# Patient Record
Sex: FEMALE | Race: WHITE | NOT HISPANIC OR LATINO | Employment: STUDENT | URBAN - METROPOLITAN AREA
[De-identification: names, ages, dates, MRNs, and addresses within clinical notes are randomized per-mention and may not be internally consistent; named-entity substitution may affect disease eponyms.]

---

## 2023-03-28 DIAGNOSIS — F41.9 ANXIETY: Primary | ICD-10-CM

## 2023-03-28 RX ORDER — ESCITALOPRAM OXALATE 10 MG/1
TABLET ORAL
Qty: 90 TABLET | Refills: 3 | Status: SHIPPED | OUTPATIENT
Start: 2023-03-28 | End: 2023-11-20 | Stop reason: SDUPTHER

## 2023-11-13 DIAGNOSIS — J45.20 MILD INTERMITTENT ASTHMA WITHOUT COMPLICATION (HHS-HCC): Primary | ICD-10-CM

## 2023-11-13 RX ORDER — MONTELUKAST SODIUM 10 MG/1
10 TABLET ORAL DAILY
Qty: 90 TABLET | Refills: 2 | Status: SHIPPED | OUTPATIENT
Start: 2023-11-13 | End: 2023-11-20 | Stop reason: SDUPTHER

## 2023-11-13 RX ORDER — DESOGESTREL AND ETHINYL ESTRADIOL 0.15-0.03
KIT ORAL
COMMUNITY
Start: 2023-10-24

## 2023-11-13 RX ORDER — MONTELUKAST SODIUM 10 MG/1
1 TABLET ORAL DAILY
COMMUNITY
Start: 2021-05-13 | End: 2023-11-13 | Stop reason: SDUPTHER

## 2023-11-20 ENCOUNTER — OFFICE VISIT (OUTPATIENT)
Dept: PRIMARY CARE | Facility: CLINIC | Age: 18
End: 2023-11-20
Payer: COMMERCIAL

## 2023-11-20 VITALS
TEMPERATURE: 97.3 F | HEART RATE: 85 BPM | DIASTOLIC BLOOD PRESSURE: 72 MMHG | HEIGHT: 65 IN | BODY MASS INDEX: 22.66 KG/M2 | WEIGHT: 136 LBS | OXYGEN SATURATION: 99 % | SYSTOLIC BLOOD PRESSURE: 116 MMHG

## 2023-11-20 DIAGNOSIS — Z00.00 WELL ADULT EXAM: Primary | ICD-10-CM

## 2023-11-20 DIAGNOSIS — R53.83 OTHER FATIGUE: ICD-10-CM

## 2023-11-20 DIAGNOSIS — F41.9 ANXIETY: ICD-10-CM

## 2023-11-20 DIAGNOSIS — J45.20 MILD INTERMITTENT ASTHMA WITHOUT COMPLICATION (HHS-HCC): ICD-10-CM

## 2023-11-20 PROBLEM — F32.A ANXIETY AND DEPRESSION: Status: ACTIVE | Noted: 2023-11-20

## 2023-11-20 PROCEDURE — 1036F TOBACCO NON-USER: CPT | Performed by: FAMILY MEDICINE

## 2023-11-20 PROCEDURE — 99395 PREV VISIT EST AGE 18-39: CPT | Performed by: FAMILY MEDICINE

## 2023-11-20 RX ORDER — MONTELUKAST SODIUM 10 MG/1
10 TABLET ORAL DAILY
Qty: 90 TABLET | Refills: 2 | Status: SHIPPED | OUTPATIENT
Start: 2023-11-20

## 2023-11-20 RX ORDER — ESCITALOPRAM OXALATE 10 MG/1
15 TABLET ORAL DAILY
Qty: 45 TABLET | Refills: 11 | Status: SHIPPED | OUTPATIENT
Start: 2023-11-20 | End: 2024-02-29

## 2023-11-20 RX ORDER — CHOLECALCIFEROL (VITAMIN D3) 100000/G
POWDER (GRAM) MISCELLANEOUS
COMMUNITY

## 2023-11-20 ASSESSMENT — PATIENT HEALTH QUESTIONNAIRE - PHQ9
10. IF YOU CHECKED OFF ANY PROBLEMS, HOW DIFFICULT HAVE THESE PROBLEMS MADE IT FOR YOU TO DO YOUR WORK, TAKE CARE OF THINGS AT HOME, OR GET ALONG WITH OTHER PEOPLE: SOMEWHAT DIFFICULT
3. TROUBLE FALLING OR STAYING ASLEEP OR SLEEPING TOO MUCH: SEVERAL DAYS
1. LITTLE INTEREST OR PLEASURE IN DOING THINGS: MORE THAN HALF THE DAYS
2. FEELING DOWN, DEPRESSED OR HOPELESS: MORE THAN HALF THE DAYS
SUM OF ALL RESPONSES TO PHQ9 QUESTIONS 1 AND 2: 4

## 2023-11-20 NOTE — PROGRESS NOTES
Subjective   Patient ID: Carlie Huynh is a 18 y.o. female who presents for annual well exam.  PHQ-9 and KATRIN-7  HPI  Concerns today: Depression and anxiety    In general the patient states that her health is:    Regular dental visits: Regular  Vision problems: Has been normal  Hearing loss: no     Diet: Leaning more towards pescitarian and misses meals.  Has body issues  Exercise: She is running 20 couple days a week   Weight concerns: yes    Tobacco use:  none  Alcohol use:  Socially  Illicit drug use: None    Reproductive health:  Premenopausal-  Regular periods    Contraception: yes  Sexual activity:Yes Monogamous  Menstrual problems: none  Pregnancy history:none    Cervical cancer screening: normal  Last Pap and pelvic was done  Any history of abnormal Pap smear    Breast cancer screening:  Family history of breast cancer    Review of Systems    Objective   Physical Exam  General: Patient is alert and oriented ×3 and appears in no acute distress. No respiratory distress.    Head: Atraumatic normocephalic.    Eyes: EOMI, PERRLA      Ears: Canals patent without any irritation, tympanic membranes without inflammation, no swelling, normal light reflex.    Nose: Nares patent. Turbinates are not swollen. No discharge.    Mouth: Normal mucosa. Moist. No erythema, exudates, tonsillar enlargement.    Neck: Normal range of motion, no masses.  Thyroid is palpable and normal in size without any nodules. No anterior cervical or posterior cervical adenopathy.    Heart: Regular rate and rhythm, no murmurs clicks or gallops    Lungs: Clear to auscultation bilaterally without any rhonchi rales or wheezing, lung sounds heard throughout all lung fields    Abdomen: Soft, nontender, no rigidity, rebound, guarding or organomegaly. Bowel sounds ×4 quadrants.    Musculoskeletal: Normal range of motion, strength is grossly intact in the proximal distal muscles of the upper and lower extremities bilaterally, deep tendon reflexes +2 out  of 4 and symmetric bilaterally at the patella, Achilles, biceps, triceps, sensation intact.    Nerves: Cranial nerves II through XII appear grossly intact and without deficit    Skin: Intact, dry, no rashes or erythema    Psych: Normal affect.  Assessment/Plan   Problem List Items Addressed This Visit       Mild intermittent asthma without complication    Relevant Medications    montelukast (Singulair) 10 mg tablet     Other Visit Diagnoses       Well adult exam    -  Primary    Anxiety        Relevant Medications    escitalopram (Lexapro) 10 mg tablet    Other fatigue        Relevant Orders    CBC and Auto Differential    Comprehensive Metabolic Panel    TSH with reflex to Free T4 if abnormal    Vitamin B12    Vitamin D 1,25 Dihydroxy (for eval of hypercalcemia)    Magnesium    Ferritin    Iron and TIBC        The patient is a well 18-year-old female who is dealing with some increased depression  She also has some fatigue that we ordered labs for  For the depression she is seeing a counselor.  We did increase her Lexapro so she will be taking 15 mg daily  Labs ordered  Discussed that she should increase her meals.  She is dealing with some body image issues.  Suggested that she start to eat some small protein snacks throughout the day.  Patient did say that she does have some thoughts that relate to suicidal ideations but denies that she has any plan.  She is in contact with her counselor and also has mentioned these to her mother and is very open about talking with about them.  States that she felt that it would just be easier if she was not here sometimes.  We could have her follow-up in 6 weeks so that we can see if the medication increase is working.

## 2024-01-08 ENCOUNTER — LAB (OUTPATIENT)
Dept: LAB | Facility: LAB | Age: 19
End: 2024-01-08
Payer: COMMERCIAL

## 2024-01-08 ENCOUNTER — APPOINTMENT (OUTPATIENT)
Dept: PRIMARY CARE | Facility: CLINIC | Age: 19
End: 2024-01-08
Payer: COMMERCIAL

## 2024-01-08 DIAGNOSIS — R53.83 OTHER FATIGUE: ICD-10-CM

## 2024-01-08 LAB
ALBUMIN SERPL BCP-MCNC: 4.4 G/DL (ref 3.4–5)
ALP SERPL-CCNC: 59 U/L (ref 33–110)
ALT SERPL W P-5'-P-CCNC: 6 U/L (ref 7–45)
ANION GAP SERPL CALC-SCNC: 11 MMOL/L (ref 10–20)
AST SERPL W P-5'-P-CCNC: 13 U/L (ref 9–39)
BASOPHILS # BLD AUTO: 0.03 X10*3/UL (ref 0–0.1)
BASOPHILS NFR BLD AUTO: 0.5 %
BILIRUB SERPL-MCNC: 0.2 MG/DL (ref 0–1.2)
BUN SERPL-MCNC: 10 MG/DL (ref 6–23)
CALCIUM SERPL-MCNC: 9.4 MG/DL (ref 8.6–10.3)
CHLORIDE SERPL-SCNC: 107 MMOL/L (ref 98–107)
CO2 SERPL-SCNC: 26 MMOL/L (ref 21–32)
CREAT SERPL-MCNC: 0.8 MG/DL (ref 0.5–1.05)
EGFRCR SERPLBLD CKD-EPI 2021: >90 ML/MIN/1.73M*2
EOSINOPHIL # BLD AUTO: 0.14 X10*3/UL (ref 0–0.7)
EOSINOPHIL NFR BLD AUTO: 2.5 %
ERYTHROCYTE [DISTWIDTH] IN BLOOD BY AUTOMATED COUNT: 13.2 % (ref 11.5–14.5)
FERRITIN SERPL-MCNC: 26 NG/ML (ref 8–150)
GLUCOSE SERPL-MCNC: 79 MG/DL (ref 74–99)
HCT VFR BLD AUTO: 38.1 % (ref 36–46)
HGB BLD-MCNC: 12.9 G/DL (ref 12–16)
IMM GRANULOCYTES # BLD AUTO: 0.01 X10*3/UL (ref 0–0.7)
IMM GRANULOCYTES NFR BLD AUTO: 0.2 % (ref 0–0.9)
IRON SATN MFR SERPL: 10 % (ref 25–45)
IRON SERPL-MCNC: 41 UG/DL (ref 35–150)
LYMPHOCYTES # BLD AUTO: 2.87 X10*3/UL (ref 1.2–4.8)
LYMPHOCYTES NFR BLD AUTO: 50.6 %
MAGNESIUM SERPL-MCNC: 1.95 MG/DL (ref 1.6–2.4)
MCH RBC QN AUTO: 28.8 PG (ref 26–34)
MCHC RBC AUTO-ENTMCNC: 33.9 G/DL (ref 32–36)
MCV RBC AUTO: 85 FL (ref 80–100)
MONOCYTES # BLD AUTO: 0.47 X10*3/UL (ref 0.1–1)
MONOCYTES NFR BLD AUTO: 8.3 %
NEUTROPHILS # BLD AUTO: 2.15 X10*3/UL (ref 1.2–7.7)
NEUTROPHILS NFR BLD AUTO: 37.9 %
NRBC BLD-RTO: 0 /100 WBCS (ref 0–0)
PLATELET # BLD AUTO: 281 X10*3/UL (ref 150–450)
POTASSIUM SERPL-SCNC: 4.5 MMOL/L (ref 3.5–5.3)
PROT SERPL-MCNC: 6.9 G/DL (ref 6.4–8.2)
RBC # BLD AUTO: 4.48 X10*6/UL (ref 4–5.2)
SODIUM SERPL-SCNC: 139 MMOL/L (ref 136–145)
TIBC SERPL-MCNC: 416 UG/DL (ref 240–445)
TSH SERPL-ACNC: 3.11 MIU/L (ref 0.44–3.98)
UIBC SERPL-MCNC: 375 UG/DL (ref 110–370)
VIT B12 SERPL-MCNC: 209 PG/ML (ref 211–911)
WBC # BLD AUTO: 5.7 X10*3/UL (ref 4.4–11.3)

## 2024-01-08 PROCEDURE — 83735 ASSAY OF MAGNESIUM: CPT

## 2024-01-08 PROCEDURE — 83550 IRON BINDING TEST: CPT

## 2024-01-08 PROCEDURE — 80053 COMPREHEN METABOLIC PANEL: CPT

## 2024-01-08 PROCEDURE — 83540 ASSAY OF IRON: CPT

## 2024-01-08 PROCEDURE — 84443 ASSAY THYROID STIM HORMONE: CPT

## 2024-01-08 PROCEDURE — 82728 ASSAY OF FERRITIN: CPT

## 2024-01-08 PROCEDURE — 82652 VIT D 1 25-DIHYDROXY: CPT

## 2024-01-08 PROCEDURE — 82607 VITAMIN B-12: CPT

## 2024-01-08 PROCEDURE — 36415 COLL VENOUS BLD VENIPUNCTURE: CPT

## 2024-01-08 PROCEDURE — 85025 COMPLETE CBC W/AUTO DIFF WBC: CPT

## 2024-01-11 LAB — 1,25(OH)2D3 SERPL-MCNC: 54.8 PG/ML (ref 19.9–79.3)

## 2024-02-02 ENCOUNTER — APPOINTMENT (OUTPATIENT)
Dept: OBSTETRICS AND GYNECOLOGY | Facility: CLINIC | Age: 19
End: 2024-02-02
Payer: COMMERCIAL

## 2024-02-14 ENCOUNTER — OFFICE VISIT (OUTPATIENT)
Dept: PRIMARY CARE | Facility: CLINIC | Age: 19
End: 2024-02-14
Payer: COMMERCIAL

## 2024-02-14 VITALS
SYSTOLIC BLOOD PRESSURE: 102 MMHG | DIASTOLIC BLOOD PRESSURE: 64 MMHG | BODY MASS INDEX: 22.66 KG/M2 | HEART RATE: 70 BPM | HEIGHT: 65 IN | WEIGHT: 136 LBS | RESPIRATION RATE: 14 BRPM | OXYGEN SATURATION: 99 %

## 2024-02-14 DIAGNOSIS — R53.83 OTHER FATIGUE: ICD-10-CM

## 2024-02-14 DIAGNOSIS — M54.6 DORSALGIA OF CERVICOTHORACIC REGION: ICD-10-CM

## 2024-02-14 DIAGNOSIS — H66.90 SUBACUTE OTITIS MEDIA, UNSPECIFIED OTITIS MEDIA TYPE: Primary | ICD-10-CM

## 2024-02-14 DIAGNOSIS — M99.01 SEGMENTAL AND SOMATIC DYSFUNCTION OF CERVICAL REGION: ICD-10-CM

## 2024-02-14 DIAGNOSIS — M99.07 SEGMENTAL AND SOMATIC DYSFUNCTION OF UPPER EXTREMITY: ICD-10-CM

## 2024-02-14 DIAGNOSIS — M99.00 SEGMENTAL AND SOMATIC DYSFUNCTION OF HEAD REGION: ICD-10-CM

## 2024-02-14 DIAGNOSIS — F32.A ANXIETY AND DEPRESSION: ICD-10-CM

## 2024-02-14 DIAGNOSIS — F41.9 ANXIETY AND DEPRESSION: ICD-10-CM

## 2024-02-14 DIAGNOSIS — M54.2 DORSALGIA OF CERVICOTHORACIC REGION: ICD-10-CM

## 2024-02-14 PROBLEM — N94.6 DYSMENORRHEA IN ADOLESCENT: Status: ACTIVE | Noted: 2024-02-14

## 2024-02-14 PROBLEM — B37.31 YEAST INFECTION OF THE VAGINA: Status: ACTIVE | Noted: 2024-02-14

## 2024-02-14 PROBLEM — R53.81 MALAISE AND FATIGUE: Status: ACTIVE | Noted: 2024-02-14

## 2024-02-14 PROBLEM — M99.09 SEGMENTAL AND SOMATIC DYSFUNCTION: Status: ACTIVE | Noted: 2024-02-14

## 2024-02-14 PROCEDURE — 98926 OSTEOPATH MANJ 3-4 REGIONS: CPT | Performed by: FAMILY MEDICINE

## 2024-02-14 PROCEDURE — 99213 OFFICE O/P EST LOW 20 MIN: CPT | Performed by: FAMILY MEDICINE

## 2024-02-14 PROCEDURE — 1036F TOBACCO NON-USER: CPT | Performed by: FAMILY MEDICINE

## 2024-02-14 RX ORDER — AMOXICILLIN 500 MG/1
500 TABLET, FILM COATED ORAL EVERY 8 HOURS SCHEDULED
Qty: 21 TABLET | Refills: 0 | Status: SHIPPED | OUTPATIENT
Start: 2024-02-14 | End: 2024-02-21

## 2024-02-14 NOTE — PROGRESS NOTES
Subjective   Patient ID: Carlie Huynh is a 18 y.o. female who presents for Follow-up (Right ear pain ).  [facility]  [facility] ear- [facility] 2nd week of Jan.  Can't hear well and ringing and wooshing in the ear. Hurts at night when not doing things.  Having some congestion.      Fatigue- Has low ferritin and vitamin B 12.  Has issues with nails breaking.  Not tolerating the cold and having fatigue. Having constipation.    Go to bed early and wake exhausted.        Review of Systems    Objective   Physical Exam  General: Patient is alert and orient x3 and appears in no acute distress.  Some pain distress.    Ears: Canals are patent, tympanic membranes without inflammation bilaterally    Neck: Decreased range of motion in all planes    Heart: Regular rate and rhythm no murmurs clicks or gallops    Lungs: Clear to auscultation bilaterally without rhonchi rales or wheezing    Musculoskeletal: Strength was grossly intact.  Deep tendon reflexes intact.  Sensation intact.  Decreased range of motion    Osteopathic structural:  In the head region there is increased suboccipital tension  In the cervical region C1 posterior tender point  In the upper extremity  right upper extremity was protracted inferior tension of pectoralis minor    Assessment/Plan   Problem List Items Addressed This Visit       Anxiety and depression    Dorsalgia of cervicothoracic region     Other Visit Diagnoses       Subacute otitis media, unspecified otitis media type    -  Primary    Other fatigue        Relevant Orders    Thyroglobulin and Antithyroglobulin    Thyroid Peroxidase (TPO) Antibody    Thyroid Stimulating Hormone    Triiodothyronine, Free    Thyroxine, Free    Thyroid Stimulating Immunoglobulin    Ferritin    Iron level    Vitamin B12    Sanam-Barr virus VCA antibody panel    Cytomegalovirus antibody, IgG    Segmental and somatic dysfunction of cervical region        Segmental and somatic dysfunction of head region        Segmental and  somatic dysfunction of upper extremity            Osteopathic manipulative therapy was utilized  In the head region as suboccipital release  In the cervical region as functional positional release  In the upper extremity as muscle energy    Patient tolerated the procedure well and noticed improvement after the treatment.    Instructed to drink plenty of water    Fatigue  - Labs ordered to further evaluate    The ear pain the patient is dealing with appears to be related to muscle

## 2024-02-26 ENCOUNTER — OFFICE VISIT (OUTPATIENT)
Dept: OBSTETRICS AND GYNECOLOGY | Facility: CLINIC | Age: 19
End: 2024-02-26
Payer: COMMERCIAL

## 2024-02-26 VITALS
BODY MASS INDEX: 22.97 KG/M2 | WEIGHT: 138.01 LBS | SYSTOLIC BLOOD PRESSURE: 110 MMHG | DIASTOLIC BLOOD PRESSURE: 70 MMHG

## 2024-02-26 DIAGNOSIS — Z01.419 ENCOUNTER FOR WELL WOMAN EXAM WITH ROUTINE GYNECOLOGICAL EXAM: Primary | ICD-10-CM

## 2024-02-26 PROCEDURE — 99395 PREV VISIT EST AGE 18-39: CPT | Performed by: NURSE PRACTITIONER

## 2024-02-26 PROCEDURE — 1036F TOBACCO NON-USER: CPT | Performed by: NURSE PRACTITIONER

## 2024-02-26 NOTE — PROGRESS NOTES
HPI:   Carlie Huynh is a 18 y.o. who presents today for her annual gynecologic exam with complaints    She has the following concerns;  She is considering stopping her birth control. She c/o fatigue and weight gain. She is having some upcoming thyroid testing, had some recent abnormal lab results with her PCP.     GYN HISTORY:  Periods are regular every 28-30 days, lasting 5 days.   Dysmenorrhea:none. Cyclic symptoms include none.   No intermenstrual bleeding, spotting, or discharge.    Current contraception: OCP (estrogen/progesterone)      Requests STD testing: no     PAP History   First PAP due at age 19, next well woman exam.   HPV vaccine: unsure if she got this, but has vaccine records at home that she will check.     Health Screening  Family history of breast, uterine, ovarian or colon cancer: yes - paternal grandmother has a hx of breast cancer .          The patient feels safe at home.         Review of Systems:   Constitutional: no fever and no chills.  Cardiovascular: no chest pain.   Respiratory: no shortness of breath.   Gastrointestinal: no nausea, no abdominal pain and no constipation  Genitourinary: no dysuria, no urinary incontinence, no vaginal dryness, no pelvic pain and no vaginal discharge.   Neurological: no headache.  Psychiatric: no anxiety and no depression.              Objective         /70   Wt 62.6 kg (138 lb 0.1 oz)   LMP 02/12/2024 (Approximate)   BMI 22.97 kg/m²         Physical Exam:   Constitutional: Alert and in no acute distress. Well developed, well nourished.      Neck: No neck asymmetry. Supple. Thyroid not enlarged and there were no palpable thyroid nodules.      Cardiovascular: Heart rate and rhythm were normal, normal S1 and S2, no gallops, and no murmurs.      Pulmonary: No respiratory distress. Clear bilateral breath sounds.      Chest: Breasts: deferred.      Abdomen: Soft nontender; no abdominal mass palpated. Normal bowel sounds. No organomegaly.       Genitourinary:   - External genitalia: Normal.   - Palpation of lymph nodes in groin: No inguinal lymphadenopathy.   - Bartholin's Urethral and Skenes Glands: Normal.   - Urethra: Normal.    -Bladder: Normal on palpation.   - Vagina: Normal.   - Cervix: Normal.   - Uterus: Normal. Right Adnexa/parametria: Normal. Left Adnexa/parametria: Normal.   - Perianal Area: Normal.      Skin: Normal skin color and pigmentation, normal skin turgor, and no rash     Psychiatric: Alert and oriented x 3. Affect normal to patient baseline. Mood: Appropriate.            Assessment/Plan       Diagnoses and all orders for this visit:  Encounter for well woman exam with routine gynecological exam  Carlie presents for well woman exam. She is doing well. She plans to stop her ocp, will monitor periods. She will notify the office if she would like to restart her ocp. PAP dane at next well woman exam.   Follow-up annually; sooner if needed.       MICKI Chavira-CNP

## 2024-02-29 DIAGNOSIS — F41.9 ANXIETY: ICD-10-CM

## 2024-02-29 RX ORDER — ESCITALOPRAM OXALATE 10 MG/1
15 TABLET ORAL DAILY
Qty: 135 TABLET | Refills: 0 | Status: SHIPPED | OUTPATIENT
Start: 2024-02-29 | End: 2024-06-03

## 2024-03-11 ENCOUNTER — LAB (OUTPATIENT)
Dept: LAB | Facility: LAB | Age: 19
End: 2024-03-11
Payer: COMMERCIAL

## 2024-03-11 DIAGNOSIS — R53.83 OTHER FATIGUE: ICD-10-CM

## 2024-03-11 LAB
FERRITIN SERPL-MCNC: 37 NG/ML (ref 8–150)
IRON SERPL-MCNC: 163 UG/DL (ref 35–150)
T4 FREE SERPL-MCNC: 0.72 NG/DL (ref 0.61–1.12)
TSH SERPL-ACNC: 1.93 MIU/L (ref 0.44–3.98)
VIT B12 SERPL-MCNC: 1011 PG/ML (ref 211–911)

## 2024-03-11 PROCEDURE — 84432 ASSAY OF THYROGLOBULIN: CPT

## 2024-03-11 PROCEDURE — 84445 ASSAY OF TSI GLOBULIN: CPT

## 2024-03-11 PROCEDURE — 82607 VITAMIN B-12: CPT

## 2024-03-11 PROCEDURE — 86376 MICROSOMAL ANTIBODY EACH: CPT

## 2024-03-11 PROCEDURE — 84439 ASSAY OF FREE THYROXINE: CPT

## 2024-03-11 PROCEDURE — 84443 ASSAY THYROID STIM HORMONE: CPT

## 2024-03-11 PROCEDURE — 86644 CMV ANTIBODY: CPT

## 2024-03-11 PROCEDURE — 82728 ASSAY OF FERRITIN: CPT

## 2024-03-11 PROCEDURE — 86664 EPSTEIN-BARR NUCLEAR ANTIGEN: CPT

## 2024-03-11 PROCEDURE — 86665 EPSTEIN-BARR CAPSID VCA: CPT

## 2024-03-11 PROCEDURE — 86663 EPSTEIN-BARR ANTIBODY: CPT

## 2024-03-11 PROCEDURE — 36415 COLL VENOUS BLD VENIPUNCTURE: CPT

## 2024-03-11 PROCEDURE — 86800 THYROGLOBULIN ANTIBODY: CPT

## 2024-03-11 PROCEDURE — 83540 ASSAY OF IRON: CPT

## 2024-03-11 PROCEDURE — 84481 FREE ASSAY (FT-3): CPT

## 2024-03-12 LAB
CMV IGG AVIDITY SERPL IA-RTO: NONREACTIVE %
EBV EA IGG SER QL: NEGATIVE
EBV NA AB SER QL: POSITIVE
EBV VCA IGG SER IA-ACNC: POSITIVE
EBV VCA IGM SER IA-ACNC: NEGATIVE
T3FREE SERPL-MCNC: 3.3 PG/ML (ref 3–4.7)
THYROPEROXIDASE AB SERPL-ACNC: 413 IU/ML

## 2024-03-13 ENCOUNTER — TELEPHONE (OUTPATIENT)
Dept: PRIMARY CARE | Facility: CLINIC | Age: 19
End: 2024-03-13
Payer: COMMERCIAL

## 2024-03-13 NOTE — TELEPHONE ENCOUNTER
----- Message from Uvaldo Larson, DO sent at 3/13/2024  8:34 AM EDT -----  Please let the patient know that lab results showed Sanam-Barr virus/mono past infection but nothing that is occurring now  Thyroid peroxidase antibody was positive at 413 and it should be less than 60.  This tells us that she does have Hashimoto's thyroiditis but currently her thyroid is in the normal range we do not need any treatment for the thyroid itself.  This is something we will continue to follow and can discuss at further visits  Iron level was slightly elevated but I want her to continue to take the iron as her ferritin level is still low at 37 and I would like to see that above 50.  Cytomegalovirus was nonreactive

## 2024-03-13 NOTE — RESULT ENCOUNTER NOTE
Please let the patient know that lab results showed Sanam-Barr virus/mono past infection but nothing that is occurring now  Thyroid peroxidase antibody was positive at 413 and it should be less than 60.  This tells us that she does have Hashimoto's thyroiditis but currently her thyroid is in the normal range we do not need any treatment for the thyroid itself.  This is something we will continue to follow and can discuss at further visits  Iron level was slightly elevated but I want her to continue to take the iron as her ferritin level is still low at 37 and I would like to see that above 50.  Cytomegalovirus was nonreactive

## 2024-03-14 LAB
BILL ONLY-THYROGLOBULIN: NORMAL
THYROGLOB AB SERPL-ACNC: <0.9 IU/ML (ref 0–4)
THYROGLOB SERPL-MCNC: 17.8 NG/ML (ref 1.3–31.8)
THYROGLOB SERPL-MCNC: NORMAL NG/ML (ref 1.3–31.8)

## 2024-03-15 LAB — TSI SER-ACNC: <1 TSI INDEX

## 2024-03-18 ENCOUNTER — TELEPHONE (OUTPATIENT)
Dept: PRIMARY CARE | Facility: CLINIC | Age: 19
End: 2024-03-18
Payer: COMMERCIAL

## 2024-03-19 ENCOUNTER — TELEPHONE (OUTPATIENT)
Dept: PRIMARY CARE | Facility: CLINIC | Age: 19
End: 2024-03-19
Payer: COMMERCIAL

## 2024-06-01 DIAGNOSIS — F41.9 ANXIETY: ICD-10-CM

## 2024-06-03 RX ORDER — ESCITALOPRAM OXALATE 10 MG/1
15 TABLET ORAL DAILY
Qty: 135 TABLET | Refills: 2 | Status: SHIPPED | OUTPATIENT
Start: 2024-06-03 | End: 2025-02-28

## 2024-08-14 ENCOUNTER — APPOINTMENT (OUTPATIENT)
Dept: PRIMARY CARE | Facility: CLINIC | Age: 19
End: 2024-08-14
Payer: COMMERCIAL

## 2024-08-14 VITALS
WEIGHT: 141 LBS | SYSTOLIC BLOOD PRESSURE: 108 MMHG | HEART RATE: 72 BPM | OXYGEN SATURATION: 99 % | DIASTOLIC BLOOD PRESSURE: 68 MMHG | RESPIRATION RATE: 16 BRPM | HEIGHT: 65 IN | BODY MASS INDEX: 23.49 KG/M2

## 2024-08-14 DIAGNOSIS — R79.0 LOW FERRITIN: ICD-10-CM

## 2024-08-14 DIAGNOSIS — E06.3 HASHIMOTO'S THYROIDITIS: ICD-10-CM

## 2024-08-14 DIAGNOSIS — F41.9 ANXIETY AND DEPRESSION: ICD-10-CM

## 2024-08-14 DIAGNOSIS — F32.A ANXIETY AND DEPRESSION: ICD-10-CM

## 2024-08-14 DIAGNOSIS — E55.9 VITAMIN D DEFICIENCY: ICD-10-CM

## 2024-08-14 DIAGNOSIS — Z00.129 ENCOUNTER FOR ROUTINE CHILD HEALTH EXAMINATION WITHOUT ABNORMAL FINDINGS: Primary | ICD-10-CM

## 2024-08-14 PROBLEM — Z86.59 HISTORY OF DEPRESSION: Status: ACTIVE | Noted: 2024-08-14

## 2024-08-14 PROBLEM — F90.0 ATTENTION DEFICIT HYPERACTIVITY DISORDER (ADHD), PREDOMINANTLY INATTENTIVE TYPE: Status: ACTIVE | Noted: 2024-08-14

## 2024-08-14 PROCEDURE — 99395 PREV VISIT EST AGE 18-39: CPT | Performed by: FAMILY MEDICINE

## 2024-08-14 PROCEDURE — 3008F BODY MASS INDEX DOCD: CPT | Performed by: FAMILY MEDICINE

## 2024-08-14 ASSESSMENT — PATIENT HEALTH QUESTIONNAIRE - PHQ9
1. LITTLE INTEREST OR PLEASURE IN DOING THINGS: SEVERAL DAYS
2. FEELING DOWN, DEPRESSED OR HOPELESS: SEVERAL DAYS
SUM OF ALL RESPONSES TO PHQ9 QUESTIONS 1 AND 2: 2

## 2024-08-14 NOTE — PROGRESS NOTES
Subjective   Patient ID: Carlie Huynh is a 19 y.o. female who presents for annual well exam.  PHQ-9 and KATRIN-7  HPI  Clinical mental health planning.    Waiting for date for marriage.   Having an intolerance to gluten.  She feels not well after eating it.    Still having the fatigue.      TPO antibodies are elevated.     Concerns today:     In general the patient states that her health is:    Regular dental visits: Regular  Vision problems: Has been normal  Hearing loss: no     Diet:Doing well eating.    Exercise: She is walking daily  Weight concerns: yes    Tobacco use:  none  Alcohol use:  Socially  Illicit drug use: None    Reproductive health:  Premenopausal-  Regular periods    Contraception: no  Sexual activity:Yes Monogamous  Menstrual problems: none  Pregnancy history:none    Cervical cancer screening: normal  Last Pap and pelvic was done  Any history of abnormal Pap smear    Breast cancer screening:  Family history of breast cancer    Review of Systems    Objective   Physical Exam  General: Patient is alert and oriented ×3 and appears in no acute distress. No respiratory distress.    Head: Atraumatic normocephalic.    Eyes: EOMI, PERRLA      Ears: Canals patent without any irritation, tympanic membranes without inflammation, no swelling, normal light reflex.    Nose: Nares patent. Turbinates are not swollen. No discharge.    Mouth: Normal mucosa. Moist. No erythema, exudates, tonsillar enlargement.    Neck: Normal range of motion, no masses.  Thyroid is palpable and normal in size without any nodules. No anterior cervical or posterior cervical adenopathy.    Heart: Regular rate and rhythm, no murmurs clicks or gallops    Lungs: Clear to auscultation bilaterally without any rhonchi rales or wheezing, lung sounds heard throughout all lung fields    Abdomen: Soft, nontender, no rigidity, rebound, guarding or organomegaly. Bowel sounds ×4 quadrants.    Musculoskeletal: Normal range of motion, strength is  grossly intact in the proximal distal muscles of the upper and lower extremities bilaterally, deep tendon reflexes +2 out of 4 and symmetric bilaterally at the patella, Achilles, biceps, triceps, sensation intact.    Nerves: Cranial nerves II through XII appear grossly intact and without deficit    Skin: Intact, dry, no rashes or erythema    Psych: Normal affect.  Assessment/Plan   Problem List Items Addressed This Visit    None  Visit Diagnoses       Well adult exam    -  Primary          The patient is a well 19-year-old female who is dealing with some increased depression  For the depression she is seeing a counselor.  Lexapro 15 mg daily    L- Theanine 200-600 mg nightly along with the magnesium.  This is for sleep and anxiety    Recheck labs in the future for iron and thyroid

## 2025-01-25 DIAGNOSIS — J45.20 MILD INTERMITTENT ASTHMA WITHOUT COMPLICATION (HHS-HCC): ICD-10-CM

## 2025-01-27 RX ORDER — MONTELUKAST SODIUM 10 MG/1
10 TABLET ORAL DAILY
Qty: 90 TABLET | Refills: 2 | Status: SHIPPED | OUTPATIENT
Start: 2025-01-27

## 2025-03-17 ENCOUNTER — APPOINTMENT (OUTPATIENT)
Dept: OBSTETRICS AND GYNECOLOGY | Facility: CLINIC | Age: 20
End: 2025-03-17
Payer: COMMERCIAL

## 2025-03-17 VITALS
DIASTOLIC BLOOD PRESSURE: 60 MMHG | WEIGHT: 138.01 LBS | SYSTOLIC BLOOD PRESSURE: 104 MMHG | BODY MASS INDEX: 22.97 KG/M2

## 2025-03-17 DIAGNOSIS — Z01.419 ENCOUNTER FOR WELL WOMAN EXAM WITH ROUTINE GYNECOLOGICAL EXAM: Primary | ICD-10-CM

## 2025-03-17 DIAGNOSIS — Z12.4 SCREENING FOR MALIGNANT NEOPLASM OF CERVIX: ICD-10-CM

## 2025-03-17 DIAGNOSIS — Z11.3 SCREEN FOR STD (SEXUALLY TRANSMITTED DISEASE): ICD-10-CM

## 2025-03-17 PROCEDURE — 88175 CYTOPATH C/V AUTO FLUID REDO: CPT

## 2025-03-17 PROCEDURE — 87491 CHLMYD TRACH DNA AMP PROBE: CPT

## 2025-03-17 PROCEDURE — 99385 PREV VISIT NEW AGE 18-39: CPT | Performed by: NURSE PRACTITIONER

## 2025-03-17 PROCEDURE — 1036F TOBACCO NON-USER: CPT | Performed by: NURSE PRACTITIONER

## 2025-03-17 PROCEDURE — 87591 N.GONORRHOEAE DNA AMP PROB: CPT

## 2025-03-17 NOTE — PROGRESS NOTES
HPI:   Carlie Huynh is a 19 y.o. who presents today for her annual gynecologic exam with complaints    She has the following concerns;  continues to have some fatigue. Working on some thyroid issues/ hashimoto's thyroiditis with her PCP. She is increasing dietary sources of iodine in her diet.   Due for PAP today.     GYN HISTORY:  Periods are regular every 28-30 days, lasting 7-10 days. Bleeding is normal flow the first 3 days. The remaining days she has light flow and light spotting days 6-10.   Dysmenorrhea:none. Cyclic symptoms include none.   No intermenstrual bleeding, spotting, or discharge.    Current contraception:using condoms for pregnancy prevention.       Requests STD testing: yes.      PAP History   First PAP due today.   HPV vaccine: no.     Health Screening  Family history of breast, uterine, ovarian or colon cancer: yes - paternal grandmother has a hx of breast cancer .          The patient feels safe at home.         Review of Systems:   Constitutional: no fever and no chills.  Cardiovascular: no chest pain.   Respiratory: no shortness of breath.   Gastrointestinal: no nausea, no abdominal pain and no constipation  Genitourinary: no dysuria, no urinary incontinence, no vaginal dryness, no pelvic pain and no vaginal discharge.   Neurological: no headache.  Psychiatric: no anxiety and no depression.              Objective         /60   Wt 62.6 kg (138 lb 0.1 oz)   LMP 02/22/2025   BMI 22.97 kg/m²         Physical Exam:   Constitutional: Alert and in no acute distress. Well developed, well nourished.      Neck: No neck asymmetry. Supple. Thyroid not enlarged and there were no palpable thyroid nodules.      Cardiovascular: Heart rate and rhythm were normal, normal S1 and S2, no gallops, and no murmurs.      Pulmonary: No respiratory distress. Clear bilateral breath sounds.      Chest: Breasts: deferred.      Abdomen: Soft nontender; no abdominal mass palpated. Normal bowel sounds. No  organomegaly.      Genitourinary:   - External genitalia: Normal.   - Palpation of lymph nodes in groin: No inguinal lymphadenopathy.   - Bartholin's Urethral and Skenes Glands: Normal.   - Urethra: Normal.    -Bladder: Normal on palpation.   - Vagina: Normal.   - Cervix: Normal.   - Uterus: Normal. Right Adnexa/parametria: Normal. Left Adnexa/parametria: Normal.   - Perianal Area: Normal.      Skin: Normal skin color and pigmentation, normal skin turgor, and no rash     Psychiatric: Alert and oriented x 3. Affect normal to patient baseline. Mood: Appropriate.            Assessment/Plan       Diagnoses and all orders for this visit:  Encounter for well woman exam with routine gynecological exam  Carlie presents for well woman exam. She is working on some thyroid issues with her PCP. She is working on dietary changes/ vitamin and iron supplementation for her fatigue. We discussed possibility of an ocp to help lighten flow/ days of bleeding. She does not want to start a pill at this time. Using condoms for pregnancy prevention. This is working well for her.   Screen cervix   PAP obtained.   Screen STD  -GC and chlamydia via thinprep.   Follow-up annually; sooner if needed, or if periods worsen.         MICKI Christie-CNP

## 2025-03-18 LAB
C TRACH RRNA SPEC QL NAA+PROBE: NEGATIVE
N GONORRHOEA DNA SPEC QL PROBE+SIG AMP: NEGATIVE

## 2025-03-20 LAB
CYTOLOGY CMNT CVX/VAG CYTO-IMP: NORMAL
LAB AP HPV HR: NORMAL
LAB AP PAP ADDITIONAL TESTS: NORMAL
LABORATORY COMMENT REPORT: NORMAL
LMP START DATE: NORMAL
PATH REPORT.TOTAL CANCER: NORMAL

## 2025-06-24 ENCOUNTER — APPOINTMENT (OUTPATIENT)
Dept: OBSTETRICS AND GYNECOLOGY | Facility: CLINIC | Age: 20
End: 2025-06-24
Payer: COMMERCIAL

## 2025-06-24 VITALS — SYSTOLIC BLOOD PRESSURE: 100 MMHG | WEIGHT: 143 LBS | DIASTOLIC BLOOD PRESSURE: 68 MMHG | BODY MASS INDEX: 23.8 KG/M2

## 2025-06-24 DIAGNOSIS — F41.9 ANXIETY DUE TO INVASIVE PROCEDURE: Primary | ICD-10-CM

## 2025-06-24 PROCEDURE — 99212 OFFICE O/P EST SF 10 MIN: CPT | Performed by: NURSE PRACTITIONER

## 2025-06-24 RX ORDER — LORAZEPAM 0.5 MG/1
TABLET ORAL
Qty: 1 TABLET | Refills: 0 | Status: SHIPPED | OUTPATIENT
Start: 2025-06-24

## 2025-06-24 NOTE — PROGRESS NOTES
Subjective   Patient ID: Carlie Huynh is a 20 y.o. female who presents for Contraception (Reviewing  EMR/Last Annual Exam: 03/17/2025/Negative Pap 2025).  HPI  Here to discuss birth control. She is interested in a LARC; specifically a Kyleena.     Review of Systems   All other systems reviewed and are negative.      Objective   Physical Exam  Constitutional:       Appearance: Normal appearance.   Pulmonary:      Effort: Pulmonary effort is normal.   Skin:     General: Skin is warm and dry.   Neurological:      Mental Status: She is alert.   Psychiatric:         Mood and Affect: Mood normal.         Behavior: Behavior normal.         Assessment/Plan   Diagnoses and all orders for this visit:  Anxiety due to invasive procedure  -     LORazepam (Ativan) 0.5 mg tablet; Take one table once an hour before procedure.  Discussed IUD risks, benefits and how to place. She would like to take ativan as needed for anxiety before the procedure.   Follow-up with start of next cycle for placement.        MICKI Christie-CNP 06/24/25 9:43 AM

## 2025-06-30 ENCOUNTER — APPOINTMENT (OUTPATIENT)
Dept: OBSTETRICS AND GYNECOLOGY | Facility: CLINIC | Age: 20
End: 2025-06-30
Payer: COMMERCIAL

## 2025-06-30 VITALS — SYSTOLIC BLOOD PRESSURE: 112 MMHG | BODY MASS INDEX: 23.8 KG/M2 | WEIGHT: 143 LBS | DIASTOLIC BLOOD PRESSURE: 80 MMHG

## 2025-06-30 DIAGNOSIS — Z32.02 URINE PREGNANCY TEST NEGATIVE: ICD-10-CM

## 2025-06-30 DIAGNOSIS — Z30.430 ENCOUNTER FOR IUD INSERTION: Primary | ICD-10-CM

## 2025-06-30 DIAGNOSIS — Z11.3 SCREENING FOR STD (SEXUALLY TRANSMITTED DISEASE): ICD-10-CM

## 2025-06-30 LAB — PREGNANCY TEST URINE, POC: NEGATIVE

## 2025-06-30 PROCEDURE — 81025 URINE PREGNANCY TEST: CPT | Performed by: NURSE PRACTITIONER

## 2025-06-30 PROCEDURE — 58300 INSERT INTRAUTERINE DEVICE: CPT | Performed by: NURSE PRACTITIONER

## 2025-06-30 RX ORDER — LEVONORGESTREL 19.5 MG/1
1 INTRAUTERINE DEVICE INTRAUTERINE ONCE
COMMUNITY

## 2025-06-30 NOTE — PROGRESS NOTES
Pt up independent in room, door alarm present.  Calm and cooperative with cares. Waiting for placement for discharge   Subjective   Patient ID: Carlie Huynh is a 20 y.o. female who presents for Insertion of an intrauterine device (Reviewing  EMR/Last Annual Exam:  03/17/2025/Negative Pap 2025/- best friend-Solange is in the room with the patient- ).  HPI  Here for IUD placement. LMP was 6/15/25. She abstained from intercourse for the last two weeks; her urine HCG is negative today. Consent signed electronically.     Review of Systems   All other systems reviewed and are negative.      Objective   Physical Exam  Constitutional:       Appearance: Normal appearance.   Pulmonary:      Effort: Pulmonary effort is normal.   Genitourinary:     General: Normal vulva.      Vagina: Normal.      Cervix: Normal.      Uterus: Normal.       Adnexa: Right adnexa normal and left adnexa normal.   Skin:     General: Skin is warm and dry.   Neurological:      Mental Status: She is alert.   Psychiatric:         Mood and Affect: Mood normal.     Patient ID: Carlie Huynh is a 20 y.o. female.    IUD Management    Performed by: VELIA Christie  Authorized by: VELIA Christie    Procedure: IUD insertion    Pregnancy risk: reasonably certain the patient is not pregnant    Date/Time of Insertion:  6/30/2025 1:08 PM  Immediately prior to procedure a time out was called: yes    Pelvic exam performed: yes    Speculum placed in vagina: yes    Cervix cleaned and prepped: yes    Tenaculum/Allis/Ring Forceps applied to cervix: yes    Anesthesia used: yes    Local anesthesia:  Topical  Local anesthetic:  Lidocaine  Uterus sound depth (cm):  7.5  Cervix dilated: yes    Cervix dilated with:  Cervical os finder  IUD inserted without complications: yes    OSM: levonorgestreL 17.5 mcg/24 hr (5 yrs) 19.5 mg  Strings trimmed to (cm):  3  Patient tolerated procedure well: yes    Estimated blood loss (mL):  0  Intended removal date: 5 years        Assessment/Plan   Diagnoses and all orders for this visit:  Encounter for IUD insertion  IUD per  procedure noted. Pt tolerated procedure well.   Screening for STD (sexually transmitted disease)  -     C. trachomatis / N. gonorrhoeae, Amplified, Urogenital  Urine pregnancy test negative  -     POCT pregnancy, urine manually resulted  Other orders  -     IUD Management  Follow-up in 4-6 weeks for string check.        MICKI Christie-CNP 06/30/25 1:03 PM

## 2025-07-01 LAB
C TRACH RRNA SPEC QL NAA+PROBE: NOT DETECTED
N GONORRHOEA RRNA SPEC QL NAA+PROBE: NOT DETECTED
QUEST GC CT AMPLIFIED (ALWAYS MESSAGE): NORMAL

## 2025-07-12 DIAGNOSIS — F41.9 ANXIETY: ICD-10-CM

## 2025-07-14 RX ORDER — ESCITALOPRAM OXALATE 10 MG/1
15 TABLET ORAL DAILY
Qty: 135 TABLET | Refills: 2 | Status: SHIPPED | OUTPATIENT
Start: 2025-07-14

## 2025-08-14 ENCOUNTER — APPOINTMENT (OUTPATIENT)
Dept: PRIMARY CARE | Facility: CLINIC | Age: 20
End: 2025-08-14
Payer: COMMERCIAL

## 2025-08-18 ENCOUNTER — APPOINTMENT (OUTPATIENT)
Dept: OBSTETRICS AND GYNECOLOGY | Facility: CLINIC | Age: 20
End: 2025-08-18
Payer: COMMERCIAL

## 2025-08-18 VITALS
BODY MASS INDEX: 23.59 KG/M2 | WEIGHT: 141.76 LBS | SYSTOLIC BLOOD PRESSURE: 100 MMHG | DIASTOLIC BLOOD PRESSURE: 60 MMHG

## 2025-08-18 DIAGNOSIS — Z30.431 IUD CHECK UP: Primary | ICD-10-CM

## 2025-08-18 PROCEDURE — 1036F TOBACCO NON-USER: CPT | Performed by: NURSE PRACTITIONER

## 2025-08-18 PROCEDURE — 99212 OFFICE O/P EST SF 10 MIN: CPT | Performed by: NURSE PRACTITIONER

## 2026-03-23 ENCOUNTER — APPOINTMENT (OUTPATIENT)
Dept: OBSTETRICS AND GYNECOLOGY | Facility: CLINIC | Age: 21
End: 2026-03-23
Payer: COMMERCIAL

## 2026-08-24 ENCOUNTER — APPOINTMENT (OUTPATIENT)
Dept: OBSTETRICS AND GYNECOLOGY | Facility: CLINIC | Age: 21
End: 2026-08-24
Payer: COMMERCIAL